# Patient Record
Sex: FEMALE | Race: WHITE
[De-identification: names, ages, dates, MRNs, and addresses within clinical notes are randomized per-mention and may not be internally consistent; named-entity substitution may affect disease eponyms.]

---

## 2021-05-21 ENCOUNTER — HOSPITAL ENCOUNTER (OUTPATIENT)
Dept: HOSPITAL 61 - LAB | Age: 36
End: 2021-05-21
Attending: SURGERY
Payer: OTHER GOVERNMENT

## 2021-05-21 DIAGNOSIS — C73: ICD-10-CM

## 2021-05-21 DIAGNOSIS — Z01.812: Primary | ICD-10-CM

## 2021-05-21 DIAGNOSIS — Z20.822: ICD-10-CM

## 2021-05-21 PROCEDURE — U0003 INFECTIOUS AGENT DETECTION BY NUCLEIC ACID (DNA OR RNA); SEVERE ACUTE RESPIRATORY SYNDROME CORONAVIRUS 2 (SARS-COV-2) (CORONAVIRUS DISEASE [COVID-19]), AMPLIFIED PROBE TECHNIQUE, MAKING USE OF HIGH THROUGHPUT TECHNOLOGIES AS DESCRIBED BY CMS-2020-01-R: HCPCS

## 2021-05-24 ENCOUNTER — HOSPITAL ENCOUNTER (OUTPATIENT)
Dept: HOSPITAL 61 - SURG | Age: 36
Setting detail: OBSERVATION
LOS: 1 days | Discharge: HOME | End: 2021-05-25
Attending: SURGERY | Admitting: SURGERY
Payer: OTHER GOVERNMENT

## 2021-05-24 VITALS — DIASTOLIC BLOOD PRESSURE: 68 MMHG | SYSTOLIC BLOOD PRESSURE: 103 MMHG

## 2021-05-24 VITALS — DIASTOLIC BLOOD PRESSURE: 69 MMHG | SYSTOLIC BLOOD PRESSURE: 116 MMHG

## 2021-05-24 VITALS — HEIGHT: 63 IN | WEIGHT: 185.19 LBS | BODY MASS INDEX: 32.81 KG/M2

## 2021-05-24 VITALS — DIASTOLIC BLOOD PRESSURE: 67 MMHG | SYSTOLIC BLOOD PRESSURE: 107 MMHG

## 2021-05-24 VITALS — DIASTOLIC BLOOD PRESSURE: 67 MMHG | SYSTOLIC BLOOD PRESSURE: 125 MMHG

## 2021-05-24 VITALS — DIASTOLIC BLOOD PRESSURE: 64 MMHG | SYSTOLIC BLOOD PRESSURE: 103 MMHG

## 2021-05-24 VITALS — DIASTOLIC BLOOD PRESSURE: 66 MMHG | SYSTOLIC BLOOD PRESSURE: 103 MMHG

## 2021-05-24 VITALS — SYSTOLIC BLOOD PRESSURE: 105 MMHG | DIASTOLIC BLOOD PRESSURE: 66 MMHG

## 2021-05-24 VITALS — DIASTOLIC BLOOD PRESSURE: 56 MMHG | SYSTOLIC BLOOD PRESSURE: 94 MMHG

## 2021-05-24 VITALS — SYSTOLIC BLOOD PRESSURE: 102 MMHG | DIASTOLIC BLOOD PRESSURE: 60 MMHG

## 2021-05-24 VITALS — SYSTOLIC BLOOD PRESSURE: 96 MMHG | DIASTOLIC BLOOD PRESSURE: 56 MMHG

## 2021-05-24 VITALS — DIASTOLIC BLOOD PRESSURE: 66 MMHG | SYSTOLIC BLOOD PRESSURE: 104 MMHG

## 2021-05-24 DIAGNOSIS — C73: ICD-10-CM

## 2021-05-24 DIAGNOSIS — F32.9: ICD-10-CM

## 2021-05-24 DIAGNOSIS — G43.909: ICD-10-CM

## 2021-05-24 DIAGNOSIS — E04.2: Primary | ICD-10-CM

## 2021-05-24 DIAGNOSIS — Z79.899: ICD-10-CM

## 2021-05-24 PROCEDURE — 96376 TX/PRO/DX INJ SAME DRUG ADON: CPT

## 2021-05-24 PROCEDURE — A4930 STERILE, GLOVES PER PAIR: HCPCS

## 2021-05-24 PROCEDURE — A4364 ADHESIVE, LIQUID OR EQUAL: HCPCS

## 2021-05-24 PROCEDURE — A4556 ELECTRODES, PAIR: HCPCS

## 2021-05-24 PROCEDURE — 60240 REMOVAL OF THYROID: CPT

## 2021-05-24 PROCEDURE — G0378 HOSPITAL OBSERVATION PER HR: HCPCS

## 2021-05-24 PROCEDURE — A6219 GAUZE <= 16 SQ IN W/BORDER: HCPCS

## 2021-05-24 PROCEDURE — A4452 WATERPROOF TAPE: HCPCS

## 2021-05-24 PROCEDURE — 96374 THER/PROPH/DIAG INJ IV PUSH: CPT

## 2021-05-24 PROCEDURE — A4222 INFUSION SUPPLIES WITH PUMP: HCPCS

## 2021-05-24 PROCEDURE — 88307 TISSUE EXAM BY PATHOLOGIST: CPT

## 2021-05-24 PROCEDURE — 81025 URINE PREGNANCY TEST: CPT

## 2021-05-24 PROCEDURE — G0379 DIRECT REFER HOSPITAL OBSERV: HCPCS

## 2021-05-24 PROCEDURE — 96361 HYDRATE IV INFUSION ADD-ON: CPT

## 2021-05-24 RX ADMIN — KETOROLAC TROMETHAMINE SCH MG: 15 INJECTION, SOLUTION INTRAMUSCULAR; INTRAVENOUS at 17:37

## 2021-05-24 RX ADMIN — MORPHINE SULFATE PRN MG: 2 INJECTION, SOLUTION INTRAMUSCULAR; INTRAVENOUS at 10:01

## 2021-05-24 RX ADMIN — KETOROLAC TROMETHAMINE SCH MG: 15 INJECTION, SOLUTION INTRAMUSCULAR; INTRAVENOUS at 23:57

## 2021-05-24 RX ADMIN — FENTANYL CITRATE PRN MCG: 50 INJECTION INTRAMUSCULAR; INTRAVENOUS at 09:49

## 2021-05-24 RX ADMIN — FENTANYL CITRATE PRN MCG: 50 INJECTION INTRAMUSCULAR; INTRAVENOUS at 10:01

## 2021-05-24 RX ADMIN — PROCHLORPERAZINE EDISYLATE PRN MG: 5 INJECTION INTRAMUSCULAR; INTRAVENOUS at 09:55

## 2021-05-24 RX ADMIN — DEXTROSE, SODIUM CHLORIDE, SODIUM LACTATE, POTASSIUM CHLORIDE, AND CALCIUM CHLORIDE SCH MLS/HR: 5; .6; .31; .03; .02 INJECTION, SOLUTION INTRAVENOUS at 11:57

## 2021-05-24 RX ADMIN — MORPHINE SULFATE PRN MG: 2 INJECTION, SOLUTION INTRAMUSCULAR; INTRAVENOUS at 10:20

## 2021-05-24 RX ADMIN — KETOROLAC TROMETHAMINE SCH MG: 15 INJECTION, SOLUTION INTRAMUSCULAR; INTRAVENOUS at 11:52

## 2021-05-24 RX ADMIN — PROCHLORPERAZINE EDISYLATE PRN MG: 5 INJECTION INTRAMUSCULAR; INTRAVENOUS at 10:01

## 2021-05-24 NOTE — NUR
awake states she is feeling better. explained would be giving her Toradol on 
schedule--"helped her migraine" resting quietly.  remains at bedside. ambulated to 
the bathroom and voided.

## 2021-05-24 NOTE — PDOC4
Operative Note


Operative Note


Date: May 24 of 2021 at 09 30


Preoperative diagnosis: Thyroid nodules FNA proven papillary carcinoma


Postoperative diagnosis: Same


Procedure: Total thyroidectomy


Surgeon: Ken


Assistant surgeon: Anastacio


Specimen: Thyroid


Dictation: Patient is a 35-year-old female was found to have bilateral nodules 

of the thyroid underwent FNA and found to have papillary carcinoma one of the 

nodules.  The procedure total thyroidectomy was explained to the patient detail 

risk benefits were also discussed including bleeding infection injury to the 

recurrent laryngeal nerves alternatives to this procedure also discussed with 

the patient who seemed to understand and gave both verbal and written consent to

have the procedure performed.  Patient was taken to the operating room placed 

the supine position general anesthesia was initiated once patient was asleep and

intubated her neck was prepped and draped usual sterile fashion using 

ChloraPrep.  Area on the anterior surface of the neck was injected with quarter 

percent Marcaine with epinephrine incision was made with 15 blade scalpel is 

carried down through the subcutaneous tissue using electrocautery right 

hemostasis through the platysmas muscle down to the strap muscles a superior 

flap was propagated with blunt and sharp dissection as well as an inferior flap 

dissected with sharp and blunt dissection down to the sternal notch.  A Mahorner

retractor was then placed the strap muscles in the midline were incised with 

electrocautery this carried down to the thyroid tensions were first turned to 

the left thyroid the strap muscles were taken off the thyroid with 

electrocautery sharp dissection and blunt dissection.  Attention was then turned

to the superior pole superior pole vessels were encircled with a right angle 

clamp and tied with a 3-0 Vicryl as well as a medium clip.  The vessels were 

then transected tension was then turned to the inferior pole similarly the 

vessels were tied and transected the thyroid was then dissected towards the 

midline.  The recurrent laryngeal nerve was visualized and tested with the Nims 

stimulator.  The nerve was protected as the thyroid continued to be dissected 

off the trachea through Boo's ligament.  Once this was done to the midline 

tensions were then turned to the right thyroid dissection was similarly 

undertaken with the superior and inferior poles ligated with Vicryl.  Similarly 

on the right side the recurrent laryngeal nerve was visualized stimulated with 

the Nims stimulator protected from dissection as the thyroid on the right side 

was dissected medially to the midline and removed using the harmonic scalpel.  

The specimen was marked with 2 sutures a long suture in the right superior pole 

and a short suture in the left inferior pole and sent for pathology.  The wound 

was irrigated suctioned dry hemostasis deemed be appropriate and the midline 

strap muscles were closed with a running 3-0 Vicryl the platysma muscle was 

reapproximated with single interrupted 3-0 Vicryl's skin was reapproximated 4-0 

subcuticular Monocryl Mastisol Steri-Strips and island dressing were applied.  

Patient was awakened and extubated in the operating room taken to recovery in 

stable condition all sponge instrument needle counts listed as correct estimated

blood loss 20 mL











MICKEY AYALA MD             May 24, 2021 09:34

## 2021-05-24 NOTE — NUR
upon arrival to the floor from recovery; complaining of severe migraine. she was mildly 
nauseated and photosensitive. room darkened  at bedside. voice is whisper. dressing 
to anterior neck is clean dry and intact. will give home meds when available for her 
migraine.

## 2021-05-25 VITALS — SYSTOLIC BLOOD PRESSURE: 109 MMHG | DIASTOLIC BLOOD PRESSURE: 63 MMHG

## 2021-05-25 VITALS — DIASTOLIC BLOOD PRESSURE: 71 MMHG | SYSTOLIC BLOOD PRESSURE: 111 MMHG

## 2021-05-25 RX ADMIN — KETOROLAC TROMETHAMINE SCH MG: 15 INJECTION, SOLUTION INTRAMUSCULAR; INTRAVENOUS at 06:07

## 2021-05-25 RX ADMIN — KETOROLAC TROMETHAMINE SCH MG: 15 INJECTION, SOLUTION INTRAMUSCULAR; INTRAVENOUS at 12:00

## 2021-05-25 RX ADMIN — DEXTROSE, SODIUM CHLORIDE, SODIUM LACTATE, POTASSIUM CHLORIDE, AND CALCIUM CHLORIDE SCH MLS/HR: 5; .6; .31; .03; .02 INJECTION, SOLUTION INTRAVENOUS at 01:44

## 2021-05-25 RX ADMIN — OXYCODONE HYDROCHLORIDE AND ACETAMINOPHEN PRN TAB: 5; 325 TABLET ORAL at 12:36

## 2021-05-25 RX ADMIN — OXYCODONE HYDROCHLORIDE AND ACETAMINOPHEN PRN TAB: 5; 325 TABLET ORAL at 06:07

## 2021-05-25 NOTE — DISCH
DISCHARGE INSTRUCTIONS


Condition on Discharge


Condition on Discharge:  Stable





Activity After Discharge


Activity Instructions for Disc:  Resume previous activity


Driving Instructions after Dis:  Do not drive today





Diet after Discharge


Diet after Discharge:  Regular





Wound Incision Care


Wound/Incision Care:  May get incision wet, No wound care needed





Contacting the  after DC


Call your doctor for:  Concerns you may have





Follow-Up


Follow up with:  Dr Rogers as scheduled, call [phone] for questions


Follow Up With:  PCP for lab check











SANDRA CALL            May 25, 2021 09:16

## 2021-05-25 NOTE — NUR
Discharge instructions given and prescriptions sent via electronic to pharmacy. Answered 
questions and concerns. Verbalized understanding. Pain pill given prior to discharge. Pt 
dc'd home accompanied by spouse. Escorted by transportation via w/c.

## 2021-05-25 NOTE — PDOC
SURGICAL PROGRESS NOTE


DATE: 5/25/21 


TIME: 09:16


Subjective


ready to go home


feels better


tolerating diet


voice strong


Vital Signs





Vital Signs








  Date Time  Temp Pulse Resp B/P (MAP) Pulse Ox O2 Delivery O2 Flow Rate FiO2


 


5/25/21 07:34      Room Air  


 


5/25/21 06:37   18     


 


5/25/21 06:27 98.3 76  111/71 (84) 97   





 98.3       


 


5/24/21 14:35       2.0 








I&O











Intake and Output 


 


 5/25/21





 07:00


 


Intake Total 3280 ml


 


Output Total 1590 ml


 


Balance 1690 ml


 


 


 


Intake Oral 1480 ml


 


IV Total 1800 ml


 


Output Urine Total 1570 ml


 


Estimated Blood Loss 20 ml


 


# Voids 2








General:  Alert, Oriented X3, Cooperative


HEENT:  Other (incision intact, no ecchymosis or swelling)


Labs





Laboratory Tests








Test


 5/24/21


05:38


 


Bedside Urine HCG, Qualitative


 Hcg negative


(Negative)








Assessment/Plan


s/p total thyroidectomy


will DC home





Justicifation of Admission Dx:


Justifications for Admission:


Justification of Admission Dx:  Yes


Comments:


papillary carcinoma











SANDRA CALL APRN            May 25, 2021 09:18

## 2021-05-28 NOTE — PATHOLOGY
[facility] Accession Number: [id number]

.                                                                01

Material submitted:                                        .

thyroid gland - THYROID

.                                                                01

Clinical history:                                          .

PAPILLARY CARCINOMA OF THYROID

TOTAL THYROIDECTOMY WITH MMS

LONG STITCH RIGHT SUPERIOR POLE,SHORT STICH LEFT INFERIOR POLE

16G PRIOR TO ADDING FORMALING FROM MODULE IN INFERIOR POLE

.                                                                02

**********************************************************************

Diagnosis:

Thyroid gland and focal attached skeletal muscle, total thyroidectomy:

 - Papillary carcinoma of thyroid, forming an infiltrative sclerotic

dominant mass of right thyroid lobe and thyroid isthmus,measuring

approximately 3.9 cm in greatest dimension.

 - Tumor involvement of posterior margin of right thyroid lobe.

 - Tumor is less than 1 mm from the anterior margin and inferior margin of

right thyroid lobe, and less than 1 mm from posterior margin of thyroid

isthmus.

 - Papillary carcinoma of left thyroid lobe, consisting of 3 foci of

papillary microcarcinoma measuring up to 4 mm in greatest dimension, and

focal infiltrative sclerotic papillary carcinoma.

 - Tumor is less than 1 mm from the posterior margin and anterior margin

of resection of left thyroid lobe.

 - Metastatic papillary carcinoma involving 4 of 7 right lobe and isthmic

perithyroidal lymph nodes.

 - Adenomatous nodules, with focal Hurthle cells changes, right and left

thyroid lobes.

 - No parathyroid glands identified.

([facility]; 05/26/2021)

[facility]  05/28/2021  1347 Local

**********************************************************************

.                                                                02

Comment:

The case is also examined by Dr. Simmons, who concurs with the

diagnoses.

([facility]; 05/28/2021)

.

.

Surgical Pathology Cancer Case Summary

.

Procedure

___ Total thyroidectomy

.

Tumor Focality

___ Multifocal

.

Tumor Site

___ Right lobe

___ Left lobe

___ Isthmus

.

Tumor Size

Greatest dimension: 3.9 cm

.

Histologic Type

Papillary Carcinomas

___ Papillary carcinoma, classic (usual, conventional)

.

Margins

___ Involved by carcinoma

+ Site(s) of involvement: Posterior margin of right thyroid lobe

.

Angioinvasion

___ Not identified

.

Lymphatic Invasion

___ Not identified

.

+ Perineural Invasion

+___ Not identified

.

Extrathyroidal Extension

___ Not identified

.

Regional Lymph Nodes

Number of Lymph Nodes Involved: 4

.

Size of Largest Metastatic Deposit:

___ Specify: 0.3 cm

.

Extranodal Extension

___ Not identified

.

Number of Lymph Nodes Examined: 7

.

Pathologic Stage Classification (pTNM, AJCC 8th Edition)

Primary Tumor (pT)

___ pT2:Tumor >2 cm, but less than or equal to 4 cm in greatest dimension,

limited to thyroid

.

Regional Lymph Nodes (pN)

___ pN1:Metastasis to regional nodes

.

+ Additional Pathologic Findings

+ ___ Adenomatous nodules

+ ___ Thyroiditis: Chronic

.

+ Clinical History

+ ___ Other: Unknown

.

([facility]; 05/28/2021)

.                                                                02

Electronically signed:                                     .

Delta Flowers MD, Pathologist

NPI- [id number]

.                                                                01

Gross description:                                         .

The specimen is received in formalin, labeled "Flurry, Sonia,

thyroid-long stitch right superior pole, short stitch left inferior pole"

and consists of a 16 gm total thyroidectomy specimen oriented as long

stitch right superior pole, short stitch left inferior pole.

The specimen has the following measurements: Right lobe 4.1 x 2 x 1.6 cm ;

Left lobe 3 x 2 x 1.5 cm; and Isthmus 2.5 x 1 x 0.8 cm.

There are no lymph nodes or parathyroid tissue present. The specimen is

differentially inked as: posterior yellow, inferior red, right anterior

blue, left anterior black, and anterior isthmus blue. On sectioning, there

is a gray-white  unencapsulated mass which measures 3.9 x 2.2 x 1.5 cm and

involves 85 % of the right lobe and grossly 0 % of the left lobe. The mass

comes to within 0.4 cm of the right inferior margin.  With the exception

of a possible lesion in cassette A17, there are no additional lesions

identified in the otherwise red-brown meaty thyroid parenchyma.

Entirely submitted in 20 cassettes.

1-14 Right Lobe submitted superior to inferior with the most inferior in

A14

15 Isthmus

16-20 Left Lobe submitted superior to inferior with the most inferior in

A20 (including 0.3 cm solid unencapsulated lesion in cassette A17)

([facility]; 5/25/2021)

[facility]  05/26/2021  1749 Local

.                                                                02

Pathologist provided ICD-10:

C73, C77.0

.                                                                02

CPT                                                        .

018472, [id number]

Specimen Comment: A courtesy copy of this report has been sent to [phone]

Specimen Comment: Report sent to 

***Performed at:  01

   [facility]

   [address], [place]  [id number]

   MD Jonathan Fairchild MD Phone:  [phone]

***Performed at:  02

   [facility]

   [address], [place]  [id number]

   MD Delta Flowers MD Phone:  [phone]

## 2021-06-01 NOTE — PDOC3
Discharge Summary


Visit Information


Date of Admission:  May 24, 2021


Date of Discharge:  May 25, 2021


Admitting Diagnosis:  Thyroid nodules FNA proven papillary carcinoma


Final Diagnosis


Thyroid nodules FNA proven papillary carcinoma





Brief Hospital Course


Allergies





                                    Allergies








Coded Allergies Type Severity Reaction Last Updated Verified


 


  No Known Drug Allergies    5/24/21 No








Brief Hospital Course


Ms. Lopez  is a 35 old female who underwent  Total thyroidectomy.  

Postoperatively pain managed, eating well, and voice strong.  Ready for 

discharge home





Discharge Information


Condition at Discharge:  Stable


Follow Up:  Weeks (2)


Disposition/Orders:  D/C to Home


Scheduled


Fluoxetine Hcl (Prozac) 40 Mg Capsule, 40 MG PO DAILY for depression, (Reported)


   Entered as Reported by: HYACINTH ARROYO on 5/24/21 0653


   Last Taken: Unknown Dose on 5/23/21      Last Action: Last Taken Edited on 

5/24/21 0653 by HYACINTH ARROYO


Levothyroxine Sodium (Levothyroxine Sodium) 100 Mcg Tablet, 100 MCG PO DAILY06 

for hypothyroidism for 30 Days, #30


   Prescribed by: Sandra Dawson on 5/25/21 0913


Propranolol Hcl (Propranolol Hcl) 80 Mg Cap.sa.24h, 120 MG PO DAILY for 

migraines, (Reported)


   Entered as Reported by: HYACINTH ARROYO on 5/24/21 0653


   Last Taken: Unknown Dose on 5/23/21      Last Action: Last Taken Edited on 

5/24/21 0653 by HYACINTH ARROYO





Scheduled PRN


Oxycodone/Apap 5-325 (Percocet 5-325 Mg Tablet **) 1 Each Tablet, 1 TAB PO PRN 

Q4HRS PRN for MILD PAIN, 1ST CHOICE, #20 Ref 0


   Prescribed by: Sandra Dawson on 5/25/21 0913


Rizatriptan Benzoate (Maxalt) 10 Mg Tablet, 10 MG PO PRN PRN for MIGRAINE 

HEADACHE, (Reported)


   Entered as Reported by: HYACINTH ARROYO on 5/24/21 0653


   Last Action: New Order on 5/24/21 0653 by HYACINTH ARROYO





Justicifation of Admission Dx:


Justifications for Admission:


Justification of Admission Dx:  Yes











SANDRA DAWSON             Jun 1, 2021 11:14